# Patient Record
Sex: FEMALE | Race: WHITE | NOT HISPANIC OR LATINO | URBAN - METROPOLITAN AREA
[De-identification: names, ages, dates, MRNs, and addresses within clinical notes are randomized per-mention and may not be internally consistent; named-entity substitution may affect disease eponyms.]

---

## 2018-07-16 ENCOUNTER — NEW PATIENT (OUTPATIENT)
Dept: URBAN - METROPOLITAN AREA CLINIC 44 | Facility: CLINIC | Age: 73
End: 2018-07-16
Payer: MEDICARE

## 2018-07-16 DIAGNOSIS — H25.813 COMBINED FORMS OF AGE-RELATED CATARACT, BILATERAL: ICD-10-CM

## 2018-07-16 DIAGNOSIS — Z79.84 LONG TERM (CURRENT) USE OF ORAL ANTIDIABETIC DRUGS: ICD-10-CM

## 2018-07-16 DIAGNOSIS — E11.9 TYPE 2 DIABETES MELLITUS WITHOUT COMPLICATIONS: Primary | ICD-10-CM

## 2018-07-16 PROCEDURE — 92004 COMPRE OPH EXAM NEW PT 1/>: CPT | Performed by: OPTOMETRIST

## 2018-07-16 ASSESSMENT — KERATOMETRY
OD: 38.50
OS: 44.00

## 2018-07-16 ASSESSMENT — INTRAOCULAR PRESSURE
OD: 11
OS: 11

## 2018-07-16 ASSESSMENT — VISUAL ACUITY
OS: 20/20
OD: 20/20

## 2018-08-20 ENCOUNTER — APPOINTMENT (RX ONLY)
Dept: URBAN - METROPOLITAN AREA CLINIC 161 | Facility: CLINIC | Age: 73
Setting detail: DERMATOLOGY
End: 2018-08-20

## 2018-08-20 DIAGNOSIS — L82.1 OTHER SEBORRHEIC KERATOSIS: ICD-10-CM

## 2018-08-20 DIAGNOSIS — D22 MELANOCYTIC NEVI: ICD-10-CM

## 2018-08-20 DIAGNOSIS — L81.4 OTHER MELANIN HYPERPIGMENTATION: ICD-10-CM

## 2018-08-20 DIAGNOSIS — Z85.828 PERSONAL HISTORY OF OTHER MALIGNANT NEOPLASM OF SKIN: ICD-10-CM

## 2018-08-20 PROBLEM — D22.71 MELANOCYTIC NEVI OF RIGHT LOWER LIMB, INCLUDING HIP: Status: ACTIVE | Noted: 2018-08-20

## 2018-08-20 PROBLEM — D22.5 MELANOCYTIC NEVI OF TRUNK: Status: ACTIVE | Noted: 2018-08-20

## 2018-08-20 PROBLEM — E13.9 OTHER SPECIFIED DIABETES MELLITUS WITHOUT COMPLICATIONS: Status: ACTIVE | Noted: 2018-08-20

## 2018-08-20 PROBLEM — D22.72 MELANOCYTIC NEVI OF LEFT LOWER LIMB, INCLUDING HIP: Status: ACTIVE | Noted: 2018-08-20

## 2018-08-20 PROBLEM — F41.9 ANXIETY DISORDER, UNSPECIFIED: Status: ACTIVE | Noted: 2018-08-20

## 2018-08-20 PROBLEM — D22.39 MELANOCYTIC NEVI OF OTHER PARTS OF FACE: Status: ACTIVE | Noted: 2018-08-20

## 2018-08-20 PROBLEM — I25.10 ATHEROSCLEROTIC HEART DISEASE OF NATIVE CORONARY ARTERY WITHOUT ANGINA PECTORIS: Status: ACTIVE | Noted: 2018-08-20

## 2018-08-20 PROBLEM — L85.3 XEROSIS CUTIS: Status: ACTIVE | Noted: 2018-08-20

## 2018-08-20 PROBLEM — L55.1 SUNBURN OF SECOND DEGREE: Status: ACTIVE | Noted: 2018-08-20

## 2018-08-20 PROCEDURE — 99203 OFFICE O/P NEW LOW 30 MIN: CPT

## 2018-08-20 PROCEDURE — ? COUNSELING

## 2018-08-20 PROCEDURE — ? OBSERVATION

## 2018-08-20 ASSESSMENT — LOCATION SIMPLE DESCRIPTION DERM
LOCATION SIMPLE: RIGHT 3RD TOE
LOCATION SIMPLE: LEFT PRETIBIAL REGION
LOCATION SIMPLE: LEFT BREAST
LOCATION SIMPLE: LEFT NOSE
LOCATION SIMPLE: LEFT FOREARM
LOCATION SIMPLE: UPPER BACK
LOCATION SIMPLE: LEFT TEMPLE
LOCATION SIMPLE: RIGHT FOREARM
LOCATION SIMPLE: CHEST
LOCATION SIMPLE: LEFT UPPER BACK
LOCATION SIMPLE: LEFT CALF
LOCATION SIMPLE: LEFT ANKLE
LOCATION SIMPLE: ABDOMEN
LOCATION SIMPLE: RIGHT PRETIBIAL REGION
LOCATION SIMPLE: LEFT CHEEK
LOCATION SIMPLE: RIGHT UPPER BACK

## 2018-08-20 ASSESSMENT — LOCATION ZONE DERM
LOCATION ZONE: NOSE
LOCATION ZONE: FACE
LOCATION ZONE: ARM
LOCATION ZONE: LEG
LOCATION ZONE: TRUNK
LOCATION ZONE: TOE

## 2018-08-20 ASSESSMENT — LOCATION DETAILED DESCRIPTION DERM
LOCATION DETAILED: INFERIOR THORACIC SPINE
LOCATION DETAILED: LEFT NASAL ALAR GROOVE
LOCATION DETAILED: RIGHT MEDIAL SUPERIOR CHEST
LOCATION DETAILED: LEFT SUPERIOR UPPER BACK
LOCATION DETAILED: LEFT PROXIMAL PRETIBIAL REGION
LOCATION DETAILED: UPPER STERNUM
LOCATION DETAILED: SUBXIPHOID
LOCATION DETAILED: LEFT INFERIOR CENTRAL MALAR CHEEK
LOCATION DETAILED: RIGHT PROXIMAL PRETIBIAL REGION
LOCATION DETAILED: LEFT MID-UPPER BACK
LOCATION DETAILED: LEFT PROXIMAL RADIAL DORSAL FOREARM
LOCATION DETAILED: RIGHT DORSAL 3RD TOE
LOCATION DETAILED: LEFT DISTAL CALF
LOCATION DETAILED: RIGHT PROXIMAL DORSAL FOREARM
LOCATION DETAILED: SUPERIOR THORACIC SPINE
LOCATION DETAILED: LEFT INFRAMAMMARY CREASE (INNER QUADRANT)
LOCATION DETAILED: LEFT CENTRAL TEMPLE
LOCATION DETAILED: LEFT INFERIOR MEDIAL UPPER BACK
LOCATION DETAILED: LEFT ANKLE
LOCATION DETAILED: RIGHT INFERIOR UPPER BACK

## 2018-08-20 NOTE — HPI: EVALUATION OF SKIN LESION(S)
How Severe Are Your Spot(S)?: mild
Have Your Spot(S) Been Treated In The Past?: has not been treated
Hpi Title: Evaluation of Skin Lesions
Additional History: Patient is here for a full skin exam.
Year Removed: 1900

## 2024-09-23 ENCOUNTER — OFFICE VISIT (OUTPATIENT)
Dept: URBAN - METROPOLITAN AREA CLINIC 44 | Facility: CLINIC | Age: 79
End: 2024-09-23
Payer: MEDICARE

## 2024-09-23 DIAGNOSIS — H52.223 REGULAR ASTIGMATISM, BILATERAL: ICD-10-CM

## 2024-09-23 DIAGNOSIS — H04.123 TEAR FILM INSUFFICIENCY OF BILATERAL LACRIMAL GLANDS: ICD-10-CM

## 2024-09-23 DIAGNOSIS — E11.9 TYPE 2 DIABETES MELLITUS WITHOUT COMPLICATIONS: Primary | ICD-10-CM

## 2024-09-23 DIAGNOSIS — H43.393 OTHER VITREOUS OPACITIES, BILATERAL: ICD-10-CM

## 2024-09-23 DIAGNOSIS — H25.813 COMBINED FORMS OF AGE-RELATED CATARACT, BILATERAL: ICD-10-CM

## 2024-09-23 PROCEDURE — 92004 COMPRE OPH EXAM NEW PT 1/>: CPT | Performed by: OPTOMETRIST

## 2024-09-23 ASSESSMENT — VISUAL ACUITY
OS: 20/20
OD: 20/25

## 2024-09-23 ASSESSMENT — INTRAOCULAR PRESSURE
OS: 16
OD: 16

## 2024-09-23 ASSESSMENT — KERATOMETRY
OS: 44.25
OD: 38.88